# Patient Record
Sex: FEMALE | Race: WHITE | URBAN - METROPOLITAN AREA
[De-identification: names, ages, dates, MRNs, and addresses within clinical notes are randomized per-mention and may not be internally consistent; named-entity substitution may affect disease eponyms.]

---

## 2017-03-01 NOTE — PATIENT DISCUSSION
(H40.013) Open angle with borderline findings, low risk, bilateral - Assesment : Examination revealed suspicion for Open Angle Glaucoma, based on CD asymmetry. IOP stable today OU. Riley Juarez 75 OCT OU - WNL. - Plan : Monitor for changes.   RV 1 year Exam.

## 2017-03-01 NOTE — PATIENT DISCUSSION
(Z96.1) Presence of intraocular lens - Assesment : Examination revealed patient is Pseudophakic OU. - Plan : Monitor for changes. Advised patient to call our office with decreased vision or increased symptoms.

## 2018-04-27 NOTE — PATIENT DISCUSSION
(B30.9) Viral conjunctivitis, unspecified - Assesment : Examination revealed Conjunctivitis, Viral. Informed patient that she is contagious. Advised to was hands often, do not share towels or bed linen, do not wipe her contagious eye with a Kleenex and then wipe the other with same Kleenex. - Plan : Prescribed  Tobradex TID OD for ten days then stop. Advised patient to call our office with decreased vision or increased symptoms. RTC PRN, or sooner if symptoms worsen or do not improve.

## 2018-10-16 ENCOUNTER — IMPORTED ENCOUNTER (OUTPATIENT)
Dept: URBAN - METROPOLITAN AREA CLINIC 50 | Facility: CLINIC | Age: 68
End: 2018-10-16

## 2018-11-02 NOTE — PATIENT DISCUSSION
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 - Plan : Monitor for changes. Advised patient to call our office with decreased vision or increased symptoms.

## 2018-11-02 NOTE — PATIENT DISCUSSION
(H43.811) Vitreous degeneration, right eye - Assesment : Examination revealed PVD OD. - Plan : Monitor for changes. Advised patient to call our office with decreased vision or an increase in flashes and/or floaters.

## 2018-11-02 NOTE — PATIENT DISCUSSION
(H40.013) Open angle with borderline findings, low risk, bilateral - Assesment : Examination revealed suspicion for Open Angle Glaucoma, based on C/D asymmetry. Low suspicion. IOP stable today OU. Riley Juarez 75 OCT OU - WNL. - Plan : Monitor for changes. Updated glasses Rx printed for patient today. RV in 1 Year for Complete Exam and Macular OCT, sooner with problems or changes.

## 2018-11-02 NOTE — PATIENT DISCUSSION
(F43.452) Dermatochalasis of left upper eyelid - Assesment : Examination revealed Dermatochalasis. - Plan : Monitor for changes. Advised patient to call our office with decreased vision or increased symptoms.

## 2018-11-02 NOTE — PATIENT DISCUSSION
(H35.044) Drusen (degenerative) of macula, bilateral - Assesment : Examination revealed Drusen OU. Few rare drusen. - Plan : Monitor for changes. Advised patient to call our office with decreased vision or increased symptoms.

## 2019-05-09 ENCOUNTER — IMPORTED ENCOUNTER (OUTPATIENT)
Dept: URBAN - METROPOLITAN AREA CLINIC 50 | Facility: CLINIC | Age: 69
End: 2019-05-09

## 2019-05-13 ENCOUNTER — IMPORTED ENCOUNTER (OUTPATIENT)
Dept: URBAN - METROPOLITAN AREA CLINIC 50 | Facility: CLINIC | Age: 69
End: 2019-05-13

## 2020-06-19 ENCOUNTER — IMPORTED ENCOUNTER (OUTPATIENT)
Dept: URBAN - METROPOLITAN AREA CLINIC 50 | Facility: CLINIC | Age: 70
End: 2020-06-19

## 2020-06-19 NOTE — PATIENT DISCUSSION
""".Informed patient that their cataract is visually significant and meets the criteria for cataract ""

## 2021-04-17 ASSESSMENT — VISUAL ACUITY
OD_PH: 20/30
OS_CC: J1@ 16 IN
OD_OTHER: 20/60-. >20/400.
OS_OTHER: 20/40. 20/50.
OS_OTHER: 20/50-. >20/400.
OD_OTHER: 20/60. 20/200.
OS_SC: 20/50+1
OS_SC: 20/25-3
OD_BAT: 20/60
OS_CC: J1+@ 14 IN
OD_CC: J1@ 16 IN
OD_CC: J1+@ 14 IN
OD_BAT: 20/60-
OS_PH: 20/30+2
OS_BAT: 20/50-
OS_BAT: 20/40
OD_SC: 20/70-1
OD_PH: 20/30-
OD_SC: 20/80-1

## 2021-04-17 ASSESSMENT — TONOMETRY
OS_IOP_MMHG: 20
OD_IOP_MMHG: 20
OS_IOP_MMHG: 14
OD_IOP_MMHG: 14

## 2021-07-15 ENCOUNTER — PREPPED CHART (OUTPATIENT)
Dept: URBAN - METROPOLITAN AREA CLINIC 52 | Facility: CLINIC | Age: 71
End: 2021-07-15

## 2022-01-27 ENCOUNTER — COMPREHENSIVE EXAM (OUTPATIENT)
Dept: URBAN - METROPOLITAN AREA CLINIC 50 | Facility: CLINIC | Age: 72
End: 2022-01-27

## 2022-01-27 DIAGNOSIS — H25.813: ICD-10-CM

## 2022-01-27 DIAGNOSIS — E11.9: ICD-10-CM

## 2022-01-27 DIAGNOSIS — H02.831: ICD-10-CM

## 2022-01-27 DIAGNOSIS — H52.4: ICD-10-CM

## 2022-01-27 DIAGNOSIS — H35.371: ICD-10-CM

## 2022-01-27 DIAGNOSIS — H02.834: ICD-10-CM

## 2022-01-27 DIAGNOSIS — H43.813: ICD-10-CM

## 2022-01-27 DIAGNOSIS — Z79.4: ICD-10-CM

## 2022-01-27 PROCEDURE — 92015 DETERMINE REFRACTIVE STATE: CPT

## 2022-01-27 PROCEDURE — 92134 CPTRZ OPH DX IMG PST SGM RTA: CPT

## 2022-01-27 PROCEDURE — 92014 COMPRE OPH EXAM EST PT 1/>: CPT

## 2022-01-27 ASSESSMENT — VISUAL ACUITY
OS_CC: 20/30+2
OU_SC: J2
OS_PH: 20/25-3
OD_GLARE: 20/80-2
OS_GLARE: 20/400
OD_SC: 20/100-2
OD_PH: 20/50-1
OD_GLARE: 20/200
OS_SC: 20/30+2
OS_GLARE: 20/100-2
OD_CC: 20/30

## 2022-01-27 ASSESSMENT — TONOMETRY
OS_IOP_MMHG: 16
OD_IOP_MMHG: 15